# Patient Record
Sex: MALE | Race: BLACK OR AFRICAN AMERICAN | NOT HISPANIC OR LATINO | Employment: FULL TIME | URBAN - METROPOLITAN AREA
[De-identification: names, ages, dates, MRNs, and addresses within clinical notes are randomized per-mention and may not be internally consistent; named-entity substitution may affect disease eponyms.]

---

## 2023-10-20 NOTE — PROGRESS NOTES
Patient: Joseph Rubi    [id number]  : 1985 -- AGE 38 y.o.    Provider: [facility]     Location [facility]  Followup Visit Note      Virtual or Telephone Consent  {Complete for a virtual or telephone visit:79868}  {Select who provided consent:45069}      HISTORY OF PRESENT ILLNESS     The patient's referring provider is: No ref. provider found       HISTORY OF PRESENT ILLNESS   Joseph Rubi was diagnosed with :  No ref. provider found saw Joseph Rubi last on [] and [] was ordered/continued.  Today, Joseph Fengeturned to the clinic and reported: ESS: and ANNAMARIE:       Current History    On today's visit, the patient reports ***    Sleep Schedule: He goes to bed at *** and his sleep latency is ***. He wakes by  ***. He has *** awakenings per night that last for *** minutes. He gets about *** hours of sleep per night.    PAP Adherence:  {Key Cybersecurity:77197}  Machine: {THERAPY:10673} {PRESSURE SETTINGS:16272}  Mask: {MASK TYPE:85194}  Issues with therapy: {ISSUES WITH THERAPY:93684}  Benefits with PAP: {PERCEIVED BENEFITS OF PAP:72177}      RLS Followup:   {SPPRLSFUSX:11189:::1}      Daytime Symptoms    Patient report some daytime symptoms including: {DAYTIME SYMPTOMS:32391:s}  Patient denies daytime symptoms including: {Denies:84477:s}    Naps:   {YES (DEF)/NO:33515}. Naps {ARE/ARENOT:23456:::1} refreshing.  Fatigue: {Desc; symptom severity fatigue:72588}    ESS: No data recorded   ANNAMARIE ***        REVIEW OF SYSTEMS     REVIEW OF SYSTEMS  Review of Systems      ALLERGIES AND MEDICATIONS     ALLERGIES  Not on File    MEDICATIONS: He currently has no medications in their medication list.    PAST MEDICAL HISTORY : He  has no past medical history on file.    PAST SURGICAL HISTORY: He  has no past surgical history on file.     FAMILY HISTORY: No changes since previous visit. Otherwise  "non-contributory as charted. No family history on file.      SOCIAL HISTORY  He  has no history on file for tobacco use, alcohol use, and drug use.       PHYSICAL EXAM     VITAL SIGNS: There were no vitals taken for this visit.     CURRENT WEIGHT: [unfilled]  BMI: [unfilled]  PREVIOUS WEIGHTS:  Wt Readings from Last 3 Encounters:   09/22/21 136 kg (300 lb)   05/14/21 132 kg (291 lb 8 oz)       Physical Exam  {PHYSICAL EXAM:19166}      RESULTS/DATA     No results found for: \"IRON\", \"TRANSFERRIN\", \"IRONSAT\", \"TIBC\", \"FERRITIN\"    PAP Adherence  A PAP adherence download was obtained and data was reviewed personally today in clinic. (see scanned document in EPIC)  {COMPLIANCE REPORT RESULTS:20286::\"Date Range:  *** - ***\",\"Days used: ***\",\"> 4 hours usage:  *** %\",\"Average usage hours on dates used: *** hours\",\"Residual AHI ***\"}      ASSESSMENT/PLAN     Mr. Rubi is a 38 y.o. male and  has no past medical history on file. He was referred to the [facility] for evaluation of ***    Problem List and Orders  {Assess/PlanSmartLinks:13752}         Joseph Rubi with the following problems:     SLEEP DISORDERED BREATHING:  -This is likely sleep apnea based on the the history and physical examination. STOP-BANG:  Joseph WISEMAN Pat not yet had a sleep study.  -Instruct patient to complete home/ in lab/ split night/ titration sleep study.  -HSAT is reasonable as patient likely has CINDA based on history and exam and does not have any of the following comorbidities: CHF, neuromuscular weakness, hypoventilation, or significant COPD.  -We consider treatment as indicated when testing is complete.     OBSTRUCTIVE SLEEP APNEA/ CENTRAL SLEEP APNEA/ MIXED SLEEP APNEA:  -Start/ Continue [] cmH20 []PAP through [employer].  -Sleep apnea and PAP therapy education were provided at length in the clinic today. Joseph Rubi verbalized understanding.  -Emphasized diet, exercise, and weight loss in the " clinic, as were non-supine sleep, avoiding alcohol in the late evening, and driving or operating heavy machinery when sleepy.  Joseph Gargerverbalizes understanding of the above instructions and risks.    BARIATRIC SURGERY INSTRUCTION:  -Bring your PAP and all equipment with you to surgery.  -Use your PAP with surgery and during recovery.  -Keep your head of the bed at 30* or higher.  -I advise judicious use of pain medications post operatively as pain medications can make sleep apnea worse.  -I advise close monitoring of the patient post operatively due to increased risk of complications related to sleep apnea.   -Joseph Rubi may be at higher risk of postoperative respiratory complications.Joseph Rubi understands the risk of post operative complications are higher for Joseph Rubi is at increased but not prohibitive risk due to CINDA.   -Joseph BOWEN Greyson is optimized on PAP therapy for sleep apnea as long as Joseph BOWEN Greyson is compliant with PAP use per most recent download.  -Joseph Rubi verbalizes understanding of the above instructions and risks.     CHRONIC SLEEP ONSET/ SLEEP MAINTENANCE INSOMNIA:  -likely due to poor sleep hygiene, irregular sleep schedule, depression, anxiety, untreated sleep apnea, nocturia, RLS, delayed sleep phase syndrome, and chronic pain. [Meds] may be a contributing factor as well.  -ANNAMARIE:    DEPRESSION and ANXIETY:   -Joseph Morgan taking [] and participating in psychotherapy.  -Denies HI/SI     HYPERTENSION/ ATRIAL FIBRILLATION/ CAD/ CHF:  -Blood pressure was controlled today.  -Denies headache, palpitation, and syncope in the clinic.  -Last Echo:  -Follows with PCP/ Cardiology     OBESITY/OVERWEIGHT:  -with a BMI of []. Joseph Rubi most recent Bicarbonate was [] in [].    NASAL CONGESTION:  -Instruct Joseph Rosales use appropriate Flonase spray to ease congestion.    XEROSTOMIA:  -Instruct Joseph Rosales purchase the Biotene gel to ease the dry mouth  symptom,     TOBACCO ABUSE:Joseph Rubi is a current [] PPD smoker for [] years  -Smoking Cessation given  -Decline Smoking Cessation     RESTLESS LEG SYNDROME: This occurs frequently.  Last ferritin []. We will check a ferritin level today and start OTC iron replacement to ferritin of >75 as indicated.  Caffeine []. Eliminate  Tobacco []. Smoking cessation counseling provided.  Joseph Gargkianna I discussed Joseph Garciaurrent medications that could be exacerbating Joseph Rubi symptoms. Will continue [] for now.  Associated diseases [] and response [].  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 We will consider actigraphy to compare and/or confirm sleep log findings.  Delayed Sleep Phase- ReEntrainment therapy: Set wake time, light therapy in the morning. Sleep hygiene measures at set bedtime.  Advanced Sleep Phase- ReEntrainment therapy: Set bedtime and light therapy in the evening. Sleep hygiene measures at set bedtime.  Irregular Sleep Phase- ReEntrainment therapy: Set bedtime and wake time. Daytime routine including scheduled physical activity, social activity and meal times.  Non-24 Sleep-Wake Rhythm- ReEntrainment therapy: Set bedtime and wake time. Daytime routine including scheduled physical activity, social activity and meal times.  Shift Work- Maximize sleep time to 7-8 hours. Make sure your room is dark, quiet, cool and interruptions are eliminated. Avoid light in the mornings, wear dark sunglasses driving home. Expose yourself to bright light or daylight upon waking. Avoid caffeine 8 hours prior to sleeping. We will consider modafinil, a mild stimulant, if these strategies are not effective.  Jet Lag- Try to change your sleep/wake schedule two to three days prior to travel. Expose yourself to bright light when you want to be awake.  Avoid bright light and electronic light when you are nearing time to sleep. You can take melatonin OTC as needed 1 hour prior to bedtime as needed.     SLEEPWALKING/ SLEEP EATING/ REM BEHAVIOR DISORDER:  -Instruct Joseph Rubi to make sure self and bed partner are safe.  -Instruct Joseph Rosales lock bedroom doors and windows.  -Instruct Joseph Rosales hide his/her car keys.  -Instruct Joseph Rosales pad headboard and move furniture away from the bed.  -Instruct Joseph Rubi to put pillows in between him/her with bed partner if kicking or hitting. Consider sleeping separately.  -Instruct Joseph Rubi to remove clutter and furniture from bedroom floor to avoid injury. Consider moving your mattress to the floor.  -Instruct Joseph Rubi to advise bed partner to calmly lead you back to bed with a gentle voice. Avoid touching and grabbing.  -Instruct Joseph Rubi to  find evidence of sleep eating, especially if he/she is gaining weight, consider locking your fridge and pantry at night.  -We will consider a trial of low dose clonazepam 0.5 mg nightly.     IDIOPATHIC HYPERSOMNIA VS. NARCOLEPSY:  - Will order an overnight sleep study, followed by MSLT the next day  - Perform urine toxicology prior to sleep study.  - Will call patient within 3 weeks after the test. Will discuss follow up plan at that time.  - May consider checking TSH and iron studies to rule out underlying metabolic etiologies.  -Scheduled naps  -Consolidate night time sleep.     An [place]AR[place] report was reviewed and is consistent with prescribed medications. Considered the risks of abuse, dependence, addiction, and diversion and [] medication is felt to be clinically appropriate based on documented diagnosis.  A controlled substance agreement was reviewed and signed today in clinic. Pending scanned copy in to the chart per office staff.    RTC

## 2023-10-23 ENCOUNTER — APPOINTMENT (OUTPATIENT)
Dept: SLEEP MEDICINE | Facility: CLINIC | Age: 38
End: 2023-10-23
Payer: COMMERCIAL

## 2023-10-23 ENCOUNTER — OFFICE VISIT (OUTPATIENT)
Dept: SLEEP MEDICINE | Facility: CLINIC | Age: 38
End: 2023-10-23
Payer: COMMERCIAL

## 2023-10-23 DIAGNOSIS — G47.33 OBSTRUCTIVE SLEEP APNEA, ADULT: Primary | ICD-10-CM

## 2023-10-23 PROCEDURE — 99204 OFFICE O/P NEW MOD 45 MIN: CPT | Performed by: PSYCHIATRY & NEUROLOGY

## 2023-10-23 NOTE — PROGRESS NOTES
" Patient: Joseph Rubi    [id number]  : 1985 -- AGE 38 y.o.    Provider: Jose Alejandro Mar MD     [facility]   Service Date: 10/23/2023              [facility]  New Visit Note      Virtual or Telephone Consent  An interactive audio and video telecommunication system which permits real time communications between the patient (at the originating site) and provider (at the distant site) was utilized to provide this telehealth service.   Verbal consent was requested and obtained from Joseph Rubi on this date, 10/23/23 for a telehealth visit.   I verified the patient's identity and physical location in [place].  If this is a new patient to me, I informed the patient of my name and type of active [place] license that I hold.      The patient's referring provider is: No ref. provider found    HPI: Joseph Rubi is a 38 y.o. male with severe CINDA on BiPAP since  who presents today in need of new PAP supplies.     He states his had not had supplies renewed for about a year. Feels \"completely different\" with great sleep when he uses his machine and feels energized as a result of using his machine. He uses it most nights. Some nights he falls asleep so quickly, before he is able to put his mask on.     Mask: nasal pillows  Mask fit: good  Breakthrough snoring/witnessed apneas/gasping/choking:  generally no  Morning headache: No  Morning dry mouth/sore throat: No  Patient is keeping the equipment clean    NIGHTTIME SYMPTOMS:   Snoring: without CPAP, started around 2-3 years prior to starting PAP therapy. Loud. Not clearly worsening over time. Worse when supine  Witnessed apnea: yes  Nocturnal gasping: No  Nocturnal choking: No  Sleep walking: No  Dream enactment: No  Nocturnal GERD: No  Nocturia: 1-2 per night without PAP, 0 times per night with PAP  Sleep paralysis: No  Hypnagogic/hypnopompic hallucinations: No    DAYTIME SYMPTOMS  Daytime sleepiness: recently, generally " no daytime sleepiness or fatigue if he uses his machine, but yawns throughout the day if he does not sleep with his machine  Feeling sleepy while driving: Denies    RLS symptoms: No     Cataplexy: No    SLEEP HABITS:   Bedtime: 10-11 pm, sleep latency - very short  Wake time: around 515-530 am for work  Napping: generally no  Total estimated sleep per 24 hrs: 5-6 hours some nights, 7-8 hours other nights, usually 6-7 hours    PRIOR SLEEP STUDIES:  -HST 6/30/2021 (weight 275 lbs) showed severe CINDA with AHI3% 88/h (due to 21 hypopneas and 538 obstructive apneas) and SpO2 fadi 43%.  The study was ordered due to symptoms including snoring, witnessed apneas, dry mouth, morning headache, night sweats, nocturnal reflux, nocturia, daytime sleepiness, and difficulty staying asleep.  -CPAP titration 8/22/2021: CPAP was titrated from 4 cm H2O to 18 cm H2O, and at this final setting of 18 cm H2O supine REM sleep was captured and the AHI was 10.4 and SPO2 fadi was 92%. Severe desaturations were prevented with pressures of 14 cm H2O and above. EKG showed PVCs. PLMS index 3.5/h. Patient was recommended auto BiPAP with IPAP max 25, EPAP min 14, and pressure support 5 cm H2O with size large Nuance Pro mask.    PRIOR TREATMENTS:  No stimulants or sleep aids.    Patient Active Problem List   Diagnosis    Obstructive sleep apnea, adult     MEDICAL HISTORY: none    SURGICAL HISTORY: none    MEDICATIONS: none    No Known Allergies    FAMILY HISTORY OF SLEEP DISORDERS: Mother - sleep apnea on PAP    SOCIAL HISTORY  Employment:  at [employer]; day job -   Lives with: girlfriend and 3 daughters at home (has 4 daughters)  Alcohol: no  Cigarettes: no  Illicits: no  Caffeine: 1 large serving of soda 1-2 times per day     ROS: The detailed review of symptoms sheet filled by the patient was reviewed today.  12 point ROS negative for fever/chills, blurred vision, nasal congestion, SOB, chest pain,  heartburn at night, headaches, pains that affect sleep, recent weight change, anxiety/depression, neck masses, or rash.      PHYSICAL EXAMINATION:   General: Awake. Alert. Comfortable. No apparent distress. Well-developed  Speech: Normal  Comprehension: Normal  Mood: Stable  Affect: Appropriate  Eyes:   Eyelids: normal            ENT:         Unable to assess patency of nasal passageways or for presence of nasal septum deviation. Tongue scalloping is present, tongue is enlarged, soft palate is not elongated. Uvula is not enlarged. Retrognathia is not present. Tonsils are not enlarged. Dentition very good.           Neck:      Circumference: mildly large in caliber  Cardiac: unable to assess pulses or cardiac rate/rhythm.   Pul: Normal respiratory effort   Abd:         non-obese  Neuro: Alert, well-oriented. Cranial nerves II-XII grossly normal and symmetric.  Moves all limbs symmetrically with no evidence of significant focal weakness. No abnormal movements noted. Normal gait      BiPAP download:  [place] [facility]  [place] date: 11/17/2021  Initial compliance met: Yes  Date range: 9/22/2023-10/21/2023  Days used: 83%  Days used >4 hours: 70%  Average usage (days used): 4 h 58 min  Setting: Max IPAP 25 cm H2O, min EPAP 14 cm H2O, PS 5 cm H2O  Pressure: 95th %ile 20.4/15.4 cm H2O, median 19.2/14.2 cm H2O, max 2 cm H2O  Leak: 22 L/min (95th %ile), median 0.8 L/min  AHI: 1.7      LABS/DIAGNOSTICS:  Lab Results   Component Value Date    HGB 14.4 12/02/2021    CO2 31 12/02/2021    HGBA1C 4.7 06/12/2023          ASSESSMENT AND PLAN: Mr. Joseph Rubi is a 38 y.o. male with a history of quite severe CINDA who is doing remarkably well with BiPAP. He needs new supplies.     #CINDA - Patient's sleep apnea is under very good control with CPAP, he is deriving significant subjective benefit from treatment, his compliance is good, and mask leak is well controlled overall.  -continue current PAP settings as above  -[place] to DME ([facility] new  supplies. Advised pt to contact [employer] in the next couple of days to see how he can soonest get his supplies   -advised pt to put his mask on as soon as he gets into bed to avoid him falling asleep without PAP therapy  -weight loss advised to lower severity of CINDA    All of the above was discussed with the patient in detail.         FOLLOW UP:  1 year, sooner if needed